# Patient Record
Sex: FEMALE | Race: WHITE | Employment: STUDENT | URBAN - NONMETROPOLITAN AREA
[De-identification: names, ages, dates, MRNs, and addresses within clinical notes are randomized per-mention and may not be internally consistent; named-entity substitution may affect disease eponyms.]

---
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
## 2021-08-31 ENCOUNTER — TELEPHONE (OUTPATIENT)
Dept: FAMILY MEDICINE CLINIC | Age: 21
End: 2021-08-31

## 2021-08-31 DIAGNOSIS — Z11.52 ENCOUNTER FOR SCREENING FOR COVID-19: Primary | ICD-10-CM

## 2021-08-31 NOTE — TELEPHONE ENCOUNTER
covid test ordered. appt scheduled with fidel at [facility]. Order faxed.  Patient called and made aware of appointment time

## 2021-08-31 NOTE — TELEPHONE ENCOUNTER
Pt needs an order for a rapid covid test so she can return to work. She hasn't been here in awhile but she needs an order before anyone will do the test. And it needs to be faxed to [facility] and give mother a call so she knows when she can go get it done.

## 2022-04-14 ENCOUNTER — HOSPITAL ENCOUNTER (OUTPATIENT)
Dept: GENERAL RADIOLOGY | Age: 22
Discharge: HOME | End: 2022-04-14
Payer: COMMERCIAL

## 2022-04-14 ENCOUNTER — HOSPITAL ENCOUNTER (OUTPATIENT)
Age: 22
Discharge: HOME | End: 2022-04-14
Payer: COMMERCIAL

## 2022-04-14 DIAGNOSIS — R13.10 PAIN WITH SWALLOWING: ICD-10-CM

## 2022-04-14 PROCEDURE — 71046 X-RAY EXAM CHEST 2 VIEWS: CPT

## 2022-04-14 PROCEDURE — 70360 X-RAY EXAM OF NECK: CPT

## 2022-05-09 ENCOUNTER — TELEPHONE (OUTPATIENT)
Dept: FAMILY MEDICINE CLINIC | Age: 22
End: 2022-05-09

## 2022-05-09 NOTE — TELEPHONE ENCOUNTER
----- Message from Veemg Yvonne sent at 5/9/2022 12:33 PM EDT -----  Subject: Message to Provider    QUESTIONS  Information for Provider? pt called to confirm appt for 5/10/22 at 7 am  ---------------------------------------------------------------------------  --------------  CALL BACK INFO  What is the best way for the office to contact you? OK to leave message on   voicemail  Preferred Call Back Phone Number? [phone]  ---------------------------------------------------------------------------  --------------  SCRIPT ANSWERS  Relationship to Patient?  Self

## 2022-05-10 ENCOUNTER — OFFICE VISIT (OUTPATIENT)
Dept: FAMILY MEDICINE CLINIC | Age: 22
End: 2022-05-10
Payer: COMMERCIAL

## 2022-05-10 VITALS — DIASTOLIC BLOOD PRESSURE: 65 MMHG | WEIGHT: 256 LBS | SYSTOLIC BLOOD PRESSURE: 104 MMHG | HEART RATE: 78 BPM

## 2022-05-10 DIAGNOSIS — M54.50 CHRONIC BILATERAL LOW BACK PAIN WITHOUT SCIATICA: ICD-10-CM

## 2022-05-10 DIAGNOSIS — Q67.5 CONGENITAL POSTURAL SCOLIOSIS: ICD-10-CM

## 2022-05-10 DIAGNOSIS — G89.29 CHRONIC BILATERAL LOW BACK PAIN WITHOUT SCIATICA: ICD-10-CM

## 2022-05-10 DIAGNOSIS — F41.9 ANXIETY: ICD-10-CM

## 2022-05-10 DIAGNOSIS — B96.89 ACUTE BACTERIAL SINUSITIS: ICD-10-CM

## 2022-05-10 DIAGNOSIS — R09.81 NASAL CONGESTION: ICD-10-CM

## 2022-05-10 DIAGNOSIS — Z00.00 ANNUAL PHYSICAL EXAM: Primary | ICD-10-CM

## 2022-05-10 DIAGNOSIS — E66.01 CLASS 3 SEVERE OBESITY DUE TO EXCESS CALORIES WITH BODY MASS INDEX (BMI) OF 40.0 TO 44.9 IN ADULT, UNSPECIFIED WHETHER SERIOUS COMORBIDITY PRESENT (HCC): ICD-10-CM

## 2022-05-10 DIAGNOSIS — J01.90 ACUTE BACTERIAL SINUSITIS: ICD-10-CM

## 2022-05-10 PROCEDURE — 99395 PREV VISIT EST AGE 18-39: CPT | Performed by: FAMILY MEDICINE

## 2022-05-10 RX ORDER — AMOXICILLIN AND CLAVULANATE POTASSIUM 875; 125 MG/1; MG/1
1 TABLET, FILM COATED ORAL 2 TIMES DAILY
Qty: 20 TABLET | Refills: 0 | Status: SHIPPED | OUTPATIENT
Start: 2022-05-10 | End: 2022-05-20

## 2022-05-10 RX ORDER — FLUTICASONE PROPIONATE 50 MCG
1 SPRAY, SUSPENSION (ML) NASAL DAILY
Qty: 32 G | Refills: 2 | Status: SHIPPED | OUTPATIENT
Start: 2022-05-10

## 2022-05-10 RX ORDER — METHYLPREDNISOLONE 4 MG/1
TABLET ORAL
Qty: 1 KIT | Refills: 0 | Status: SHIPPED | OUTPATIENT
Start: 2022-05-10

## 2022-05-10 SDOH — ECONOMIC STABILITY: FOOD INSECURITY: WITHIN THE PAST 12 MONTHS, THE FOOD YOU BOUGHT JUST DIDN'T LAST AND YOU DIDN'T HAVE MONEY TO GET MORE.: NEVER TRUE

## 2022-05-10 SDOH — ECONOMIC STABILITY: FOOD INSECURITY: WITHIN THE PAST 12 MONTHS, YOU WORRIED THAT YOUR FOOD WOULD RUN OUT BEFORE YOU GOT MONEY TO BUY MORE.: NEVER TRUE

## 2022-05-10 ASSESSMENT — PATIENT HEALTH QUESTIONNAIRE - PHQ9
SUM OF ALL RESPONSES TO PHQ9 QUESTIONS 1 & 2: 0
1. LITTLE INTEREST OR PLEASURE IN DOING THINGS: 0
SUM OF ALL RESPONSES TO PHQ QUESTIONS 1-9: 0
2. FEELING DOWN, DEPRESSED OR HOPELESS: 0

## 2022-05-10 ASSESSMENT — SOCIAL DETERMINANTS OF HEALTH (SDOH): HOW HARD IS IT FOR YOU TO PAY FOR THE VERY BASICS LIKE FOOD, HOUSING, MEDICAL CARE, AND HEATING?: NOT HARD AT ALL

## 2022-05-10 NOTE — PROGRESS NOTES
Chief Complaint   Patient presents with    Sinus Problem    Other     sinus drainage        Internal Administration   First Dose      Second Dose           Last COVID Lab No results found for: SARS-COV-2, SARS-COV-2 RNA, SARS-COV-2, SARS-COV-2, SARS-COV-2 BY PCR, SARS-COV-2, SARS-COV-2, SARS-COV-2          Wt Readings from Last 3 Encounters:   05/10/22 256 lb (116.1 kg)   08/10/18 (!) 245 lb 12.8 oz (111.5 kg) (>99 %, Z= 2.44)*   18 (!) 245 lb 9.6 oz (111.4 kg) (>99 %, Z= 2.44)*     * Growth percentiles are based on [facility] (Girls, 2-20 Years) data. BP Readings from Last 3 Encounters:   05/10/22 104/65   08/10/18 118/64 (76 %, Z = 0.71 /  40 %, Z = -0.25)*   18 118/72 (76 %, Z = 0.71 /  74 %, Z = 0.64)*     *BP percentiles are based on the 2017 AAP Clinical Practice Guideline for girls      Lab Results   Component Value Date    LABA1C 5.2 2016       HPI:  Mil Bravo is a 22 y.o. (: 2000) here today for    Patient states that she had flu in January and covid in February. She states she had a lingering issues of a stuffy nose since covid but over the last weeks shes developed a lot of drainage issues. She states that she has been using zicam since having covid due to her stuffy nose but its not getting better. Informed of the health advisory associated with zicam causing permanent lose of smell. Will send in for flonase nasal spray. Discussed allergies and how the sinus issues didn't start until  Last week likely not covid related. She states she is getting green mucus. [] Patient has completed an advance directive  [] Patient has NOT completed an advanced directive  [] Patient has a documented healthcare surrogate  [] Patient does NOT have a documented healthcare surrogate  [] Discussed the importance of establishing and updating an advanced directive. Patient has questions at this time and those were answered.   [] Discussed the importance of establishing and updating an advanced directive. Patient does NOT have questions at this time. Discussed with: [] Patient            [] Family             [] Other caregiver    Patient's medications, allergies, past medical, surgical, social and family histories were reviewed and updated asappropriate on 5/10/2022 at 7:20 AM.    ROS:  Review of Systems    All other systems reviewed and are negative except as noted above on 5/10/2022 at 7:20 AM. Additional review of systems may be scanned into the media section ofthis medical record. Any responses requiring further intervention were pursued. Past Medical History:   Diagnosis Date    Asthma     Nausea & vomiting     Obesity     Seizures ([facility] [address]      History reviewed. No pertinent family history. Social History     Socioeconomic History    Marital status: Single     Spouse name: Not on file    Number of children: Not on file    Years of education: Not on file    Highest education level: Not on file   Occupational History    Not on file   Tobacco Use    Smoking status: Never Smoker    Smokeless tobacco: Never Used   Substance and Sexual Activity    Alcohol use: No    Drug use: No    Sexual activity: Never   Other Topics Concern    Not on file   Social History Narrative    Not on file     Social Determinants of Health     Financial Resource Strain: Low Risk     Difficulty of Paying Living Expenses: Not hard at all   Food Insecurity: No Food Insecurity    Worried About Running Out of Food in the Last Year: Never true    [address] in the Last Year: Never true   Transportation Needs:     Lack of Transportation (Medical): Not on file    Lack of Transportation (Non-Medical):  Not on file   Physical Activity:     Days of Exercise per Week: Not on file    Minutes of Exercise per Session: Not on file   Stress:     Feeling of Stress : Not on file   Social Connections:     Frequency of Communication with Friends and Family: Not on file    Frequency of Social Gatherings with Friends and Family: Not on file    Attends Sikhism Services: Not on file    Active Member of Clubs or Organizations: Not on file    Attends Club or Organization Meetings: Not on file    Marital Status: Not on file   Intimate Partner Violence:     Fear of Current or Ex-Partner: Not on file    Emotionally Abused: Not on file    Physically Abused: Not on file    Sexually Abused: Not on file   Housing Stability:     Unable to Pay for Housing in the Last Year: Not on file    Number of [place] in the Last Year: Not on file    Unstable Housing in the Last Year: Not on file     Prior to Visit Medications    Medication Sig Taking? Authorizing Provider   ibuprofen (ADVIL;MOTRIN) 800 MG tablet Take 1 tablet by mouth every 8 hours as needed for Pain (migraines) Yes GIOVANI Saleh CNP   promethazine (PHENERGAN) 25 MG tablet Take 1 tablet by mouth every 6 hours as needed for Nausea  Patient not taking: Reported on 5/10/2022  GIOVANI Saleh CNP   drospirenone-ethinyl estradiol 3-0.03 MG TABS Take 1 tablet by mouth daily  Patient not taking: Reported on 5/10/2022  GIOVANI Saleh CNP   spironolactone (ALDACTONE) 100 MG tablet   Historical Provider, MD     Allergies   Allergen Reactions    Imitrex [Sumatriptan] Other (See Comments)     \"jaw tightened up\"       OBJECTIVE:  Estimated body mass index is 38.5 kg/m² as calculated from the following:    Height as of 8/10/18: 5' 7\" (1.702 m). Weight as of 8/10/18: 245 lb 12.8 oz (111.5 kg). Vitals:    05/10/22 0714   BP: 104/65   Pulse: 78   Weight: 256 lb (116.1 kg)       Physical Exam  Vitals and nursing note reviewed. Constitutional:       General: She is not in acute distress. Appearance: She is well-developed. She is not diaphoretic. HENT:      Head: Normocephalic and atraumatic.       Right Ear: External ear normal.      Left Ear: External ear normal.      Ears:      Comments: Fluid present behind the ear drums     Nose: Nose normal.   Eyes:      General: Lids are normal. No scleral icterus. Right eye: No discharge. Left eye: No discharge. Conjunctiva/sclera: Conjunctivae normal.      Pupils: Pupils are equal, round, and reactive to light. Neck:      Thyroid: No thyromegaly. Vascular: No JVD. Trachea: No tracheal deviation. Cardiovascular:      Rate and Rhythm: Normal rate and regular rhythm. Heart sounds: Normal heart sounds. No murmur heard. No friction rub. No gallop. Pulmonary:      Effort: Pulmonary effort is normal. No respiratory distress. Breath sounds: Normal breath sounds. No stridor. No wheezing or rales. Chest:      Chest wall: No tenderness. Breasts:      Right: No supraclavicular adenopathy. Left: No supraclavicular adenopathy. Abdominal:      General: Bowel sounds are normal. There is no distension. Palpations: Abdomen is soft. There is no mass. Tenderness: There is no abdominal tenderness. There is no guarding. Musculoskeletal:         General: Deformity (Thoracolumbar scoliosis) present. No tenderness. Normal range of motion. Cervical back: Normal range of motion and neck supple. Lymphadenopathy:      Head:      Right side of head: No submental, submandibular, tonsillar, preauricular, posterior auricular or occipital adenopathy. Left side of head: No submental, submandibular, tonsillar, preauricular, posterior auricular or occipital adenopathy. Cervical: No cervical adenopathy. Upper Body:      Right upper body: No supraclavicular or epitrochlear adenopathy. Left upper body: No supraclavicular or epitrochlear adenopathy. Skin:     General: Skin is warm and dry. Coloration: Skin is not pale. Nails: There is no clubbing. Comments: Normal turgor   Neurological:      Cranial Nerves: No cranial nerve deficit. Motor: No abnormal muscle tone.       Coordination: Coordination normal. Deep Tendon Reflexes:      Reflex Scores:       Patellar reflexes are 2+ on the right side and 2+ on the left side. Psychiatric:         Behavior: Behavior normal.         Thought Content: Thought content normal.         Judgment: Judgment normal.      Comments: Alert and oriented x 3              ASSESSMENT PLAN      Diagnosis Orders   1. Annual physical exam  LIPID PANEL    CBC with Auto Differential    Basic Metabolic Panel   2. Nasal congestion  fluticasone (FLONASE) 50 MCG/ACT nasal spray   3. Acute bacterial sinusitis  fluticasone (FLONASE) 50 MCG/ACT nasal spray    methylPREDNISolone (MEDROL DOSEPACK) 4 MG tablet    amoxicillin-clavulanate (AUGMENTIN) 875-125 MG per tablet   4. Anxiety  sertraline (ZOLOFT) 50 MG tablet   5. Class 3 severe obesity due to excess calories with body mass index (BMI) of 40.0 to 44.9 in adult, unspecified whether serious comorbidity present (HCC)  TSH with Reflex    Hemoglobin A1C   6. Chronic bilateral low back pain without sciatica  [facility] [place] [facility]OSR   7. Congenital postural scoliosis     Patient would like screening labs to be updated. I think more of her issue is allergies and sinusitis rather than long COVID. Interventions made above. If no better would get a CT scan of the sinuses. Stop Zicam.  Warned of the posted risk of anosmia. Begin sertraline for chronic daily anxiety. Physical therapy for her back. Virtual follow-up in all the above in 2 weeks. Patient should call the office immediately with new or ongoing signs or symptoms or worsening, or proceed to the emergency room. No changes in past medical history, past surgical history, social history, or family history were noted during the patient encounter unless specifically listed above. All updates of past medical history, past surgical history, social history, or family history were reviewed personally by me during the office visit.   All problems listed in the assessment are stable unless noted otherwise. Medication profile reviewed personally by me during the visit. Medication side effects and possible impairments from medications were discussed as applicable. This document was prepared by a combination of typing and transcription through a voice recognition software. I, Dr. Charity Ott, personally performed the services described in this documentation, as scribed by the above signed scribe in my presence, and it is both accurate and complete. I agree with the ROS and Past Histories independently gathered by the clinical support staff and the remaining scribed note accurately describes my personal service to the patient. 5/10/2022    8:09 AM                Scribe attestation: Latasha Castro RN, am scribing for and in the presence of Gilma Norwood MD. Electronically signed by Sheryl Chavarria RN on 5/10/2022 at 7:20 AM      Provider attestation:     I, Dr. Charity Ott, personally performed the services described in this documentation, as scribed by the above signed scribe in my presence, and it is both accurate and complete. I agree with the ROS and Past Histories independently gathered by the clinical support staff and the remaining scribed note accurately describes my personal service to the patient.       5/10/2022    8:09 AM

## 2022-07-29 DIAGNOSIS — F41.9 ANXIETY: ICD-10-CM

## 2022-10-17 DIAGNOSIS — F41.9 ANXIETY: ICD-10-CM

## 2022-12-24 ENCOUNTER — HOSPITAL ENCOUNTER (EMERGENCY)
Age: 22
Discharge: HOME | End: 2022-12-24
Attending: STUDENT IN AN ORGANIZED HEALTH CARE EDUCATION/TRAINING PROGRAM
Payer: COMMERCIAL

## 2022-12-24 ENCOUNTER — APPOINTMENT (OUTPATIENT)
Dept: GENERAL RADIOLOGY | Age: 22
End: 2022-12-24
Payer: COMMERCIAL

## 2022-12-24 VITALS
SYSTOLIC BLOOD PRESSURE: 125 MMHG | HEIGHT: 69 IN | BODY MASS INDEX: 38.51 KG/M2 | RESPIRATION RATE: 18 BRPM | DIASTOLIC BLOOD PRESSURE: 74 MMHG | WEIGHT: 260 LBS | HEART RATE: 78 BPM | TEMPERATURE: 98.3 F | OXYGEN SATURATION: 100 %

## 2022-12-24 DIAGNOSIS — S93.601A SPRAIN OF RIGHT FOOT, INITIAL ENCOUNTER: Primary | ICD-10-CM

## 2022-12-24 PROCEDURE — 99284 EMERGENCY DEPT VISIT MOD MDM: CPT

## 2022-12-24 PROCEDURE — 96372 THER/PROPH/DIAG INJ SC/IM: CPT

## 2022-12-24 PROCEDURE — 6360000002 HC RX W HCPCS: Performed by: STUDENT IN AN ORGANIZED HEALTH CARE EDUCATION/TRAINING PROGRAM

## 2022-12-24 PROCEDURE — 73630 X-RAY EXAM OF FOOT: CPT

## 2022-12-24 RX ORDER — KETOROLAC TROMETHAMINE 30 MG/ML
15 INJECTION, SOLUTION INTRAMUSCULAR; INTRAVENOUS ONCE
Status: COMPLETED | OUTPATIENT
Start: 2022-12-24 | End: 2022-12-24

## 2022-12-24 RX ADMIN — KETOROLAC TROMETHAMINE 15 MG: 30 INJECTION, SOLUTION INTRAMUSCULAR; INTRAVENOUS at 09:36

## 2022-12-24 ASSESSMENT — PAIN DESCRIPTION - DESCRIPTORS: DESCRIPTORS: ACHING

## 2022-12-24 ASSESSMENT — PAIN DESCRIPTION - PAIN TYPE: TYPE: ACUTE PAIN

## 2022-12-24 ASSESSMENT — PAIN - FUNCTIONAL ASSESSMENT
PAIN_FUNCTIONAL_ASSESSMENT: 0-10
PAIN_FUNCTIONAL_ASSESSMENT: ACTIVITIES ARE NOT PREVENTED
PAIN_FUNCTIONAL_ASSESSMENT: NONE - DENIES PAIN

## 2022-12-24 ASSESSMENT — PAIN DESCRIPTION - LOCATION: LOCATION: FOOT

## 2022-12-24 ASSESSMENT — PAIN SCALES - GENERAL
PAINLEVEL_OUTOF10: 4
PAINLEVEL_OUTOF10: 4

## 2022-12-24 ASSESSMENT — PAIN DESCRIPTION - ORIENTATION: ORIENTATION: RIGHT

## 2022-12-24 ASSESSMENT — LIFESTYLE VARIABLES: HOW OFTEN DO YOU HAVE A DRINK CONTAINING ALCOHOL: NEVER

## 2022-12-24 NOTE — ED PROVIDER NOTES
[facility] EMERGENCY DEPARTMENT      CHIEF COMPLAINT  Foot Injury     HISTORY OF PRESENT ILLNESS  Silvia Zavala is a 25 y.o. female  who presents to the ED complaining of right foot pain. Patient states that she was walking down her stairs yesterday and fell and injured her right foot. She denies any head trauma or loss of consciousness. States that she has been having pain with ambulation since then. She denies any other injuries. She denies other complaints or concerns. No other complaints, modifying factors or associated symptoms. I have reviewed the following from the nursing documentation. History reviewed. No pertinent past medical history. Past Surgical History:   Procedure Laterality Date    FOOT SURGERY  12/17/2012    left    TYMPANOSTOMY TUBE PLACEMENT       History reviewed. No pertinent family history. Social History     Socioeconomic History    Marital status: Single     Spouse name: Not on file    Number of children: Not on file    Years of education: Not on file    Highest education level: Not on file   Occupational History    Not on file   Tobacco Use    Smoking status: Never    Smokeless tobacco: Never   Vaping Use    Vaping Use: Never used   Substance and Sexual Activity    Alcohol use: No    Drug use: No    Sexual activity: Never   Other Topics Concern    Not on file   Social History Narrative    Not on file     Social Determinants of Health     Financial Resource Strain: Low Risk     Difficulty of Paying Living Expenses: Not hard at all   Food Insecurity: No Food Insecurity    Worried About Running Out of Food in the Last Year: Never true    Ran Out of Food in the Last Year: Never true   Transportation Needs: Not on file   Physical Activity: Not on file   Stress: Not on file   Social Connections: Not on file   Intimate Partner Violence: Not on file   Housing Stability: Not on file     No current facility-administered medications for this encounter.      No current outpatient medications on file. Allergies   Allergen Reactions    Imitrex [Sumatriptan] Other (See Comments)     \"jaw tightened up\"       REVIEW OF SYSTEMS  10 systems reviewed, pertinent positives per HPI otherwise noted to be negative. PHYSICAL EXAM  /74   Pulse 74   Temp 98.4 °F (36.9 °C) (Oral)   Resp 14   Ht 5' 9\" (1.753 m)   Wt 260 lb (117.9 kg)   LMP 12/12/2022 (Approximate)   SpO2 100%   BMI 38.40 kg/m²    GENERAL APPEARANCE: Awake and alert. Cooperative. No acute distress. HENT: Normocephalic. Atraumatic. Mucous membranes are moist.  NECK: Supple. EYES: PERRL. EOM's grossly intact. HEART/CHEST: RRR. No murmurs. LUNGS: Respirations unlabored. CTAB. Good air exchange. Speaking comfortably in full sentences. ABDOMEN: No tenderness. Soft. Non-distended. No masses. No organomegaly. No guarding or rebound. MUSCULOSKELETAL: No extremity edema. Compartments soft. No deformity. Tenderness palpation over entirety of right foot on both the dorsal and plantar aspects. 2+ DP and PT pulses in the right foot. Motor and sensation intact. No ankle or knee tenderness. All extremities neurovascularly intact. SKIN: Warm and dry. No acute rashes. NEUROLOGICAL: Alert and oriented. CN's 2-12 intact. No gross facial drooping. Strength 5/5, sensation intact. PSYCHIATRIC: Normal mood and affect. LABS  I have reviewed all labs for this visit. No results found for this visit on 12/24/22. RADIOLOGY  XR FOOT RIGHT (MIN 3 VIEWS)   Final Result   No acute osseous abnormality. ED COURSE/MDM  Patient seen and evaluated. Old records reviewed. Labs and imaging reviewed and results discussed with patient. Patient is a 44-year-old female presenting with right foot injury. Full HPI as detailed above. Upon arrival in the ED, vitals reassuring. Patient is resting comfortably and is in no acute distress. She is complaining of pain in her entire right foot.   She has tenderness to palpation over the dorsal and plantar aspects but no obvious deformity or swelling. Right foot is neurovascularly intact. Compartments are soft in the right lower extremity. X-ray of the right foot was obtained and did not reveal any acute osseous abnormality. Patient was treated with Toradol here in the department. Findings were discussed with her regarding her negative x-rays and she is comfortable in agreement with plan of care for discharge home. Counseled on RICE therapy. Advise follow-up with PCP if symptoms or not improving with conservative management. She is comfortable in agreement with plan of care was discharged. Given return precautions. I, Dr. Syed Rajan MD, am the primary clinician of record. Is this patient to be included in the SEP-1 Core Measure? No, infection is not suspected. During the patient's ED course, the patient was given:  Medications   ketorolac (TORADOL) injection 15 mg (15 mg IntraMUSCular Given 12/24/22 0936)        CLINICAL IMPRESSION  1. Sprain of right foot, initial encounter        Blood pressure 125/74, pulse 74, temperature 98.4 °F (36.9 °C), temperature source Oral, resp. rate 14, height 5' 9\" (1.753 m), weight 260 lb (117.9 kg), last menstrual period 12/12/2022, SpO2 100 %, not currently breastfeeding. DISPOSITION  Jenae Maderaisk was discharged to home in good condition. Patient was given scripts for the following medications. I counseled patient how to take these medications. New Prescriptions    No medications on file       Follow-up with:  Rickey Witt MD  [address] [place] [address]. Noa Mckoy  [phone]    Schedule an appointment as soon as possible for a visit   As needed    DISCLAIMER: This chart was created using Dragon dictation software. Efforts were made by me to ensure accuracy, however some errors may be present due to limitations of this technology and occasionally words are not transcribed correctly. Severa Fort, MD  12/24/22 1007

## 2023-01-05 DIAGNOSIS — F41.9 ANXIETY: ICD-10-CM

## 2023-01-05 NOTE — TELEPHONE ENCOUNTER
Last appt 5/10/2022, no appt scheduled  Routing to [facility] due to PCP out of office

## 2023-01-05 NOTE — TELEPHONE ENCOUNTER
The original prescription was discontinued on 12/24/2022 by Carole Zhang RN for the following reason: LIST CLEANUP. Renewing this prescription may not be appropriate.     Please call patient and clarify refill request

## 2023-04-04 ENCOUNTER — OFFICE VISIT (OUTPATIENT)
Dept: FAMILY MEDICINE CLINIC | Age: 23
End: 2023-04-04
Payer: COMMERCIAL

## 2023-04-04 VITALS
DIASTOLIC BLOOD PRESSURE: 78 MMHG | SYSTOLIC BLOOD PRESSURE: 112 MMHG | HEART RATE: 91 BPM | BODY MASS INDEX: 36.58 KG/M2 | WEIGHT: 247 LBS | HEIGHT: 69 IN | OXYGEN SATURATION: 97 %

## 2023-04-04 DIAGNOSIS — L75.0 ABNORMAL BODY ODOR: ICD-10-CM

## 2023-04-04 DIAGNOSIS — N92.6 ABNORMAL MENSTRUAL CYCLE: ICD-10-CM

## 2023-04-04 DIAGNOSIS — E28.2 PCOS (POLYCYSTIC OVARIAN SYNDROME): Primary | ICD-10-CM

## 2023-04-04 DIAGNOSIS — L65.9 THINNING HAIR: ICD-10-CM

## 2023-04-04 DIAGNOSIS — E66.01 CLASS 2 SEVERE OBESITY DUE TO EXCESS CALORIES WITH SERIOUS COMORBIDITY AND BODY MASS INDEX (BMI) OF 36.0 TO 36.9 IN ADULT (HCC): ICD-10-CM

## 2023-04-04 DIAGNOSIS — L65.9 HAIR LOSS: ICD-10-CM

## 2023-04-04 LAB
BASOPHILS # BLD: 0 K/UL (ref 0–0.2)
BASOPHILS NFR BLD: 0.4 %
DEPRECATED RDW RBC AUTO: 15 % (ref 12.4–15.4)
EOSINOPHIL # BLD: 0.1 K/UL (ref 0–0.6)
EOSINOPHIL NFR BLD: 0.7 %
HCT VFR BLD AUTO: 38.3 % (ref 36–48)
HGB BLD-MCNC: 12.5 G/DL (ref 12–16)
LYMPHOCYTES # BLD: 2.9 K/UL (ref 1–5.1)
LYMPHOCYTES NFR BLD: 30 %
MCH RBC QN AUTO: 26.9 PG (ref 26–34)
MCHC RBC AUTO-ENTMCNC: 32.8 G/DL (ref 31–36)
MCV RBC AUTO: 82 FL (ref 80–100)
MONOCYTES # BLD: 0.6 K/UL (ref 0–1.3)
MONOCYTES NFR BLD: 6.5 %
NEUTROPHILS # BLD: 6 K/UL (ref 1.7–7.7)
NEUTROPHILS NFR BLD: 62.4 %
PLATELET # BLD AUTO: 303 K/UL (ref 135–450)
PMV BLD AUTO: 8.6 FL (ref 5–10.5)
RBC # BLD AUTO: 4.67 M/UL (ref 4–5.2)
WBC # BLD AUTO: 9.6 K/UL (ref 4–11)

## 2023-04-04 PROCEDURE — 99204 OFFICE O/P NEW MOD 45 MIN: CPT | Performed by: NURSE PRACTITIONER

## 2023-04-04 SDOH — ECONOMIC STABILITY: INCOME INSECURITY: HOW HARD IS IT FOR YOU TO PAY FOR THE VERY BASICS LIKE FOOD, HOUSING, MEDICAL CARE, AND HEATING?: NOT HARD AT ALL

## 2023-04-04 SDOH — ECONOMIC STABILITY: FOOD INSECURITY: WITHIN THE PAST 12 MONTHS, YOU WORRIED THAT YOUR FOOD WOULD RUN OUT BEFORE YOU GOT MONEY TO BUY MORE.: NEVER TRUE

## 2023-04-04 SDOH — ECONOMIC STABILITY: FOOD INSECURITY: WITHIN THE PAST 12 MONTHS, THE FOOD YOU BOUGHT JUST DIDN'T LAST AND YOU DIDN'T HAVE MONEY TO GET MORE.: NEVER TRUE

## 2023-04-04 SDOH — ECONOMIC STABILITY: HOUSING INSECURITY
IN THE LAST 12 MONTHS, WAS THERE A TIME WHEN YOU DID NOT HAVE A STEADY PLACE TO SLEEP OR SLEPT IN A SHELTER (INCLUDING NOW)?: NO

## 2023-04-04 ASSESSMENT — PATIENT HEALTH QUESTIONNAIRE - PHQ9
2. FEELING DOWN, DEPRESSED OR HOPELESS: 0
SUM OF ALL RESPONSES TO PHQ QUESTIONS 1-9: 0
SUM OF ALL RESPONSES TO PHQ QUESTIONS 1-9: 0
1. LITTLE INTEREST OR PLEASURE IN DOING THINGS: 0
SUM OF ALL RESPONSES TO PHQ QUESTIONS 1-9: 0
SUM OF ALL RESPONSES TO PHQ9 QUESTIONS 1 & 2: 0
SUM OF ALL RESPONSES TO PHQ QUESTIONS 1-9: 0

## 2023-04-04 NOTE — PATIENT INSTRUCTIONS
Dial - use in areas where you may sweat more    Dove body wash     Lume -   [email or web address]+[email or web address]+Exact&utm_source=[email or web address]&utm_medium=[email or web address]_content=[email or web address]

## 2023-04-04 NOTE — PROGRESS NOTES
[facility]  PHYSIC[facility] PRACTICES  [facility]  [address] [place] [address].  [address] [facility] [facility]  Dept: [phone]  Dept Fax: [phone]  Loc: [phone]    Farzana Bryan is a 25 y.o. female who presents today for her medical conditions/complaints as noted below. Farzana Bryan is c/o of Annual Exam (Pt is losing a lot of hair and has some bald spots and very concerned. )        HPI:     Chief Complaint   Patient presents with    Annual Exam     Pt is losing a lot of hair and has some bald spots and very concerned. HPI    Tosha Nair presents to the office today to discuss some hair loss. She admits to feeling like her hair is becoming thinner. She feels like her hair is always been thin but now its been to the point she can even feel like you can see her scalp now. She admits to noticing her hair loss starting in 2019 after COVID, but admits to feeling like her hair loss never stopped. She states she does not die her hair and does not use heating products. She also admits to wearing her hair down majority of the time. She admits to having PCOS. She states that she is not on any oral contraceptives or any medications for her PCOS. She states she does not like oral contraceptives as she feels like they cause her to have weight gain. She has not tried any other contraceptive. She is not interested in using any type of contraceptives at this time. She states she feels like it is hard for her to lose weight. With her PCOS she also admits to her menstrual cycles being irregular and heavy. She states she does not have much acne, but she does have facial hair above her lip. She admits also noticing that she has a distinct odor even after showering. She states that the odor that she has is constant even if she is using deodorant and showering. She also admits to her hair always being oily.   She states she uses Axe body wash as she feels like it may help more because it is a stronger

## 2023-04-05 DIAGNOSIS — E28.2 PCOS (POLYCYSTIC OVARIAN SYNDROME): Primary | ICD-10-CM

## 2023-04-05 LAB
25(OH)D3 SERPL-MCNC: 33.6 NG/ML
ALBUMIN SERPL-MCNC: 4.4 G/DL (ref 3.4–5)
ALBUMIN/GLOB SERPL: 1.6 {RATIO} (ref 1.1–2.2)
ALP SERPL-CCNC: 62 U/L (ref 40–129)
ALT SERPL-CCNC: 11 U/L (ref 10–40)
ANA SER QL IA: NEGATIVE
ANION GAP SERPL CALCULATED.3IONS-SCNC: 15 MMOL/L (ref 3–16)
AST SERPL-CCNC: 15 U/L (ref 15–37)
BILIRUB SERPL-MCNC: 0.5 MG/DL (ref 0–1)
BUN SERPL-MCNC: 14 MG/DL (ref 7–20)
CALCIUM SERPL-MCNC: 9.6 MG/DL (ref 8.3–10.6)
CHLORIDE SERPL-SCNC: 101 MMOL/L (ref 99–110)
CO2 SERPL-SCNC: 24 MMOL/L (ref 21–32)
CREAT SERPL-MCNC: 0.6 MG/DL (ref 0.6–1.1)
EST. AVERAGE GLUCOSE BLD GHB EST-MCNC: 91.1 MG/DL
ESTRADIOL SERPL-MCNC: 35 PG/ML
FOLATE SERPL-MCNC: 5.41 NG/ML (ref 4.78–24.2)
FSH SERPL-ACNC: 5.8 MIU/ML
GFR SERPLBLD CREATININE-BSD FMLA CKD-EPI: >60 ML/MIN/{1.73_M2}
GLUCOSE SERPL-MCNC: 78 MG/DL (ref 70–99)
HBA1C MFR BLD: 4.8 %
LH SERPL-ACNC: 9 MIU/ML
POTASSIUM SERPL-SCNC: 4.3 MMOL/L (ref 3.5–5.1)
PROT SERPL-MCNC: 7.1 G/DL (ref 6.4–8.2)
SODIUM SERPL-SCNC: 140 MMOL/L (ref 136–145)
TSH SERPL DL<=0.005 MIU/L-ACNC: 1.73 UIU/ML (ref 0.27–4.2)
VIT B12 SERPL-MCNC: 343 PG/ML (ref 211–911)

## 2023-04-05 RX ORDER — SPIRONOLACTONE 25 MG/1
25 TABLET ORAL DAILY
Qty: 30 TABLET | Refills: 1 | Status: SHIPPED | OUTPATIENT
Start: 2023-04-05 | End: 2023-04-06 | Stop reason: SDUPTHER

## 2023-04-06 DIAGNOSIS — E28.2 PCOS (POLYCYSTIC OVARIAN SYNDROME): ICD-10-CM

## 2023-04-06 RX ORDER — SPIRONOLACTONE 25 MG/1
25 TABLET ORAL DAILY
Qty: 30 TABLET | Refills: 1 | Status: SHIPPED | OUTPATIENT
Start: 2023-04-06

## 2023-04-06 NOTE — TELEPHONE ENCOUNTER
June([facility]) called stating that pt has switched to [address] [place] [address] and needing Rx's sent there. Mentioned that pt was going to be started on Metformin as well. Not seeing Rx for this. Please advise.

## 2023-04-07 LAB
SHBG SERPL-SCNC: 41 NMOL/L (ref 30–135)
TESTOST FREE SERPL-MCNC: 4.5 PG/ML (ref 0.8–7.4)
TESTOST SERPL-MCNC: 29 NG/DL (ref 20–70)

## 2023-04-08 PROBLEM — L75.0 ABNORMAL BODY ODOR: Status: ACTIVE | Noted: 2023-04-08

## 2023-04-08 PROBLEM — L65.9 HAIR LOSS: Status: ACTIVE | Noted: 2023-04-08

## 2023-04-08 PROBLEM — L65.9 THINNING HAIR: Status: ACTIVE | Noted: 2023-04-08

## 2023-04-08 PROBLEM — E28.2 PCOS (POLYCYSTIC OVARIAN SYNDROME): Status: ACTIVE | Noted: 2023-04-08

## 2023-04-08 PROBLEM — N92.6 ABNORMAL MENSTRUAL CYCLE: Status: ACTIVE | Noted: 2023-04-08

## 2023-04-08 ASSESSMENT — ENCOUNTER SYMPTOMS
CONSTIPATION: 0
STRIDOR: 0
APNEA: 0
RESPIRATORY NEGATIVE: 1
SINUS PRESSURE: 0
RHINORRHEA: 0
EYE DISCHARGE: 0
BLOOD IN STOOL: 0
SORE THROAT: 0
ALLERGIC/IMMUNOLOGIC NEGATIVE: 1
WHEEZING: 0
DIARRHEA: 0
EYE REDNESS: 0
PHOTOPHOBIA: 0
SHORTNESS OF BREATH: 0
CHEST TIGHTNESS: 0
COLOR CHANGE: 0
ABDOMINAL PAIN: 0
COUGH: 0
EYE PAIN: 0
BACK PAIN: 0
TROUBLE SWALLOWING: 0
GASTROINTESTINAL NEGATIVE: 1
NAUSEA: 0
EYE ITCHING: 0
VOMITING: 0
CHOKING: 0

## 2023-12-28 ENCOUNTER — OFFICE VISIT (OUTPATIENT)
Dept: FAMILY MEDICINE CLINIC | Age: 23
End: 2023-12-28
Payer: COMMERCIAL

## 2023-12-28 VITALS
OXYGEN SATURATION: 96 % | SYSTOLIC BLOOD PRESSURE: 93 MMHG | BODY MASS INDEX: 35.1 KG/M2 | WEIGHT: 237 LBS | DIASTOLIC BLOOD PRESSURE: 65 MMHG | HEIGHT: 69 IN | HEART RATE: 96 BPM

## 2023-12-28 DIAGNOSIS — M54.6 CHRONIC BILATERAL THORACIC BACK PAIN: ICD-10-CM

## 2023-12-28 DIAGNOSIS — R56.9 SEIZURE (HCC): ICD-10-CM

## 2023-12-28 DIAGNOSIS — Q67.5 CONGENITAL POSTURAL SCOLIOSIS: Primary | ICD-10-CM

## 2023-12-28 DIAGNOSIS — M40.04 POSTURAL KYPHOSIS OF THORACIC REGION: ICD-10-CM

## 2023-12-28 DIAGNOSIS — L65.9 THINNING HAIR: ICD-10-CM

## 2023-12-28 DIAGNOSIS — E28.2 PCOS (POLYCYSTIC OVARIAN SYNDROME): ICD-10-CM

## 2023-12-28 DIAGNOSIS — G89.29 CHRONIC BILATERAL THORACIC BACK PAIN: ICD-10-CM

## 2023-12-28 DIAGNOSIS — L65.9 HAIR LOSS: ICD-10-CM

## 2023-12-28 PROCEDURE — 99214 OFFICE O/P EST MOD 30 MIN: CPT | Performed by: NURSE PRACTITIONER

## 2023-12-28 NOTE — PROGRESS NOTES
LEILA, Dermatology, [facility]    Hair loss  -     AFL - Claudeen Scales, LEILA, Dermatology, [facility]    Seizure [facility])      Prior to Visit Medications    Not on File     No orders of the defined types were placed in this encounter. Return if symptoms worsen or fail to improve. Patient should call the office immediately with new or ongoing signs or symptoms or worsening, or proceedto the emergency room. No changes in past medical history, past surgical history, social history, or family history were noted during the patient encounter unless specifically listed above. All updates of past medicalhistory, past surgical history, social history, or family history were reviewed personally by me during the office visit. All problems listed in the assessment are stable unless noted otherwise. Medication profilereviewed personally by me during the office visit. Medication side effects and possible impairments from medications were discussed as applicable. Call if pattern of symptoms change or persists for an extended time. This document was prepared by a combination of typing and transcription through a voice recognition software. All medications have the potential for adverse effects. All medications effect each person differently. Please read and review provided information related to medication. If the medication that you have been prescribed has the potential to cause sedation, do not drive or operate car, truck, or heavy machinery until you know how the medication will effect you. If you experience any adverse effects from the medication, please call the office or report to the emergency department.

## 2024-01-04 ENCOUNTER — TELEPHONE (OUTPATIENT)
Dept: FAMILY MEDICINE CLINIC | Age: 24
End: 2024-01-04

## 2024-01-04 NOTE — TELEPHONE ENCOUNTER
Patient saw Leslie on 12.28.23.  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  Needs immunizations on it as well.  Please call patient and advise.

## 2024-01-04 NOTE — TELEPHONE ENCOUNTER
Leslie filled out form. Called to inform and had to leave message about being ready to . Will leave on my desk to be picked up. Form scanned into chart.

## 2024-02-07 ENCOUNTER — OFFICE VISIT (OUTPATIENT)
Dept: ORTHOPEDIC SURGERY | Age: 24
End: 2024-02-07
Payer: COMMERCIAL

## 2024-02-07 VITALS — WEIGHT: 236.99 LBS | BODY MASS INDEX: 35.1 KG/M2 | HEIGHT: 69 IN

## 2024-02-07 DIAGNOSIS — M40.04 POSTURAL KYPHOSIS OF THORACIC REGION: ICD-10-CM

## 2024-02-07 DIAGNOSIS — M54.6 THORACIC BACK PAIN, UNSPECIFIED BACK PAIN LATERALITY, UNSPECIFIED CHRONICITY: Primary | ICD-10-CM

## 2024-02-07 PROCEDURE — 99203 OFFICE O/P NEW LOW 30 MIN: CPT | Performed by: ORTHOPAEDIC SURGERY

## 2024-02-07 RX ORDER — METHOCARBAMOL 750 MG/1
750 TABLET, FILM COATED ORAL 3 TIMES DAILY
Qty: 30 TABLET | Refills: 1 | Status: SHIPPED | OUTPATIENT
Start: 2024-02-07 | End: 2024-02-27

## 2024-02-07 NOTE — PROGRESS NOTES
New Patient: LUMBAR SPINE    Referring Provider:  Ruth Sagastume, *    CHIEF COMPLAINT: Thoracic back pain    HISTORY OF PRESENT ILLNESS:    Ms. Silvia Arevalo  is a pleasant 23 y.o. female sent today for evaluation of chronic thoracic and lumbar pain.  She rates her pain 6/10.  She reports intermittent numbness tingling of her lower extremities with prolonged sitting.  She denies saddle anesthesia and bowel or bladder dysfunction.      Current/Past Treatment:   Physical Therapy: Yes  Chiropractic: Yes  Injection: No  Medications: None    Past Medical History:   I reviewed past medical history which is noncontributory to her present illness  Past Surgical History:     Past Surgical History:   Procedure Laterality Date    FOOT SURGERY  12/17/2012    left    TYMPANOSTOMY TUBE PLACEMENT       Current Medications:   No current outpatient medications on file.  Allergies:  Imitrex [sumatriptan]  Social History:    reports that she has never smoked. She has never used smokeless tobacco. She reports that she does not drink alcohol and does not use drugs.  Family History:   No family history on file.    REVIEW OF SYSTEMS: Full ROS noted & scanned   CONSTITUTIONAL: Denies unexplained weight loss, fevers, chills or fatigue  NEUROLOGICAL: Denies unsteady gait or progressive weakness  MUSCULOSKELETAL: Denies joint swelling or redness  PSYCHOLOGICAL: Denies anxiety, depression   SKIN: Denies skin changes, delayed healing, rash, itching   HEMATOLOGIC: Denies easy bleeding or bruising  ENDOCRINE: Denies excessive thirst, urination, heat/cold  RESPIRATORY: Denies current dyspnea, cough  GI: Denies nausea, vomiting, diarrhea   : Denies bowel or bladder issues      PHYSICAL EXAM:    Vitals: not currently breastfeeding.    GENERAL EXAM:  General Apparence: Patient is adequately groomed with no evidence of malnutrition.  Orientation: The patient is oriented to time, place and person.   Mood & Affect:The patient's mood